# Patient Record
Sex: MALE | Race: AMERICAN INDIAN OR ALASKA NATIVE
[De-identification: names, ages, dates, MRNs, and addresses within clinical notes are randomized per-mention and may not be internally consistent; named-entity substitution may affect disease eponyms.]

---

## 2021-12-31 ENCOUNTER — HOSPITAL ENCOUNTER (EMERGENCY)
Dept: HOSPITAL 5 - ED | Age: 26
LOS: 1 days | Discharge: HOME | End: 2022-01-01
Payer: SELF-PAY

## 2021-12-31 VITALS — DIASTOLIC BLOOD PRESSURE: 64 MMHG | SYSTOLIC BLOOD PRESSURE: 110 MMHG

## 2021-12-31 DIAGNOSIS — A60.00: Primary | ICD-10-CM

## 2021-12-31 PROCEDURE — 99282 EMERGENCY DEPT VISIT SF MDM: CPT

## 2022-01-01 NOTE — EMERGENCY DEPARTMENT REPORT
ED Male  HPI





- General


Chief complaint: Urogenital-Male


Stated complaint: POSS STD


Source: patient


Mode of arrival: Ambulatory


Limitations: No Limitations





- History of Present Illness


Initial comments: 





Patient is a 26-year-old -American male with no past medical history 

presented to the ED with complaint of acute onset persistent painful ulcerated 

lesions in the penile shaft for 1 week.  Patient states that the pain has been 

persistent worse especially in the last 3 days.  Patient denies dysuria, urinary

frequency and urgency, penile discharge, testicular pain, fever, chills, cough, 

chest pain, shortness of breath, sore throat or abdominal pain.


MD Complaint: other (penile pain, ulcerated lesions)


-: Sudden, week(s) (1)


Location: penis


Radiation: none


Severity scale (0 -10): 8


Quality: burning, sharp


Consistency: constant


Improves with: none


Worsens with: none


denies other symptoms, rash (ulcerated painful lesions in th epenile shaft).  

denies: discharge, swelling, mass, urinary retention, blood in urine, dysuria, 

fever, nausea/vomiting, incontinence





- Related Data


Sexually active: Yes


                                  Previous Rx's











 Medication  Instructions  Recorded  Last Taken  Type


 


DOXYCYCLINE Hyclate [Vibramycin 100 mg PO Q12HR #14 capsule 08/01/15 Unknown Rx





CAP]    


 


Acyclovir 400 mg PO Q8H #30 tablet 01/01/22 Unknown Rx


 


Acyclovir [Acyclovir Ointment] 1 applicatio TP 5XD #1 tube 01/01/22 Unknown Rx


 


Ibuprofen [Motrin] 600 mg PO Q8H PRN #24 tablet 01/01/22 Unknown Rx











                                    Allergies











Allergy/AdvReac Type Severity Reaction Status Date / Time


 


No Known Allergies Allergy   Verified 07/31/15 23:52














ED Review of Systems


ROS: 


Stated complaint: POSS STD


Other details as noted in HPI





Constitutional: denies: chills, fever


Eyes: denies: eye pain, eye discharge, vision change


ENT: denies: ear pain, throat pain


Respiratory: denies: cough, shortness of breath, wheezing


Cardiovascular: denies: chest pain, palpitations


Endocrine: no symptoms reported


Gastrointestinal: denies: abdominal pain, nausea, diarrhea


Genitourinary: other (painful ulcerated lesions in the penile shaft).  denies: 

urgency, dysuria, frequency, hematuria, discharge, testicular pain, testicular 

mass


Musculoskeletal: denies: back pain, joint swelling, arthralgia


Skin: rash (ulcerated painful lesions in the penile).  denies: lesions, change 

in color, change in hair/nails, pruritus


Neurological: denies: headache, weakness, paresthesias


Psychiatric: denies: anxiety, depression


Hematological/Lymphatic: denies: easy bleeding, easy bruising





ED Past Medical Hx





- Past Medical History


Previous Medical History?: No


Hx Asthma: Yes





- Surgical History


Past Surgical History?: No





- Social History


Smoking Status: Never Smoker


Substance Use Type: None





- Medications


Home Medications: 


                                Home Medications











 Medication  Instructions  Recorded  Confirmed  Last Taken  Type


 


DOXYCYCLINE Hyclate [Vibramycin 100 mg PO Q12HR #14 capsule 08/01/15  Unknown Rx





CAP]     


 


Acyclovir 400 mg PO Q8H #30 tablet 01/01/22  Unknown Rx


 


Acyclovir [Acyclovir Ointment] 1 applicatio TP 5XD #1 tube 01/01/22  Unknown Rx


 


Ibuprofen [Motrin] 600 mg PO Q8H PRN #24 tablet 01/01/22  Unknown Rx














ED Physical Exam





- General


Limitations: No Limitations


General appearance: alert, in no apparent distress





- Head


Head exam: Present: atraumatic, normocephalic, normal inspection





- Eye


Eye exam: Present: normal appearance, PERRL, EOMI


Pupils: Present: normal accommodation





- ENT


ENT exam: Present: normal exam, normal orophraynx, mucous membranes moist, TM's 

normal bilaterally, normal external ear exam





- Neck


Neck exam: Present: normal inspection, full ROM





- Respiratory


Respiratory exam: Present: normal lung sounds bilaterally.  Absent: respiratory 

distress, wheezes, rales, rhonchi, chest wall tenderness, accessory muscle use, 

decreased breath sounds, prolonged expiratory





- Cardiovascular


Cardiovascular Exam: Present: regular rate, normal rhythm, normal heart sounds. 

 Absent: systolic murmur, diastolic murmur, rubs, gallop





- GI/Abdominal


GI/Abdominal exam: Present: soft, normal bowel sounds.  Absent: distended, 

tenderness, guarding, rebound, hyperactive bowel sounds, hypoactive bowel 

sounds, mass





- 


 exam: Absent: circumcision


External exam: Present: erythema, lesions (Ulcerated severely tender open 

lesions in the penile shaft), other (Male ED technician chaperone Mr. Crabtree 

present).  Absent: swelling





- Extremities Exam


Extremities exam: Present: normal inspection, full ROM, normal capillary refill.

  Absent: tenderness, pedal edema, joint swelling, calf tenderness





- Back Exam


Back exam: Present: normal inspection, full ROM.  Absent: tenderness, CVA 

tenderness (R), CVA tenderness (L), muscle spasm, paraspinal tenderness





- Neurological Exam


Neurological exam: Present: alert, oriented X3, CN II-XII intact, normal gait, 

reflexes normal





- Psychiatric


Psychiatric exam: Present: normal affect, normal mood





- Skin


Skin exam: Present: warm, dry, intact, normal color, rash (Ulcerated tender open

 lesions in the penile shaft), erythema, vesicles.  Absent: cyanosis, 

diaphoretic, urticaria, petechiae, pallor, abrasion, other





ED Course





                                   Vital Signs











  12/31/21





  21:04


 


Temperature 98.9 F


 


Pulse Rate 56 L


 


Respiratory 18





Rate 


 


Blood Pressure 110/64





[Right] 


 


O2 Sat by Pulse 94





Oximetry 














ED Medical Decision Making





- Medical Decision Making





This is a 26-year-old -American male with no past medical history 

presented to the ED with complaint of acute onset persistent painful ulcerated 

lesions in the penile shaft for 1 week.  Patient states that the pain has been 

persistent worse especially in the last 3 days.  In the ED, patient is alert and

 oriented x3 and is not in any distress.  Patient will discharge home on 

medications based on the physical exam findings of suspected genital herpes 

infection.  Patient advised return to the ED immediately if symptoms get worse, 

otherwise advised to follow-up with the [facility] department for 

further STD testing.





- Differential Diagnosis


Genital herpes; UTI; STD


Critical care attestation.: 


If time is entered above; I have spent that time in minutes in the direct care 

of this critically ill patient, excluding procedure time.








ED Disposition


Clinical Impression: 


 Primary genital herpes simplex infection





Disposition: 01 HOME / SELF CARE / HOMELESS


Is pt being admited?: No


Does the pt Need Aspirin: No


Condition: Stable


Instructions:  Genital Herpes, Viral Illness, Adult


Additional Instructions: 


Take medication with food, drink plenty of fluids and follow-up with [facility] for further evaluation and STD testing including HIV, 

and syphilis.  Return to the ED immediately if symptoms get worse.


Prescriptions: 


Acyclovir 400 mg PO Q8H #30 tablet


Acyclovir [Acyclovir Ointment] 1 applicatio TP 5XD #1 tube


Ibuprofen [Motrin] 600 mg PO Q8H PRN #24 tablet


 PRN Reason: Pain


Referrals: 


[facility] [Outside] - 7-10 days


Time of Disposition: 00:15


Print Language: ENGLISH

## 2022-03-28 ENCOUNTER — HOSPITAL ENCOUNTER (EMERGENCY)
Dept: HOSPITAL 5 - ED | Age: 27
Discharge: LEFT BEFORE DISCHARGE | End: 2022-03-28
Payer: SELF-PAY

## 2022-03-28 DIAGNOSIS — R21: Primary | ICD-10-CM

## 2022-03-28 DIAGNOSIS — Z53.21: ICD-10-CM
